# Patient Record
(demographics unavailable — no encounter records)

---

## 2025-07-15 NOTE — HISTORY OF PRESENT ILLNESS
[de-identified] : 75 year old female presents RT distal radius fracture. Pt reports a fall 7/01/25 after having her carpets cleaning & slipped & fell. She was seen at [facility], had x rays, told fracture & was placed in a splint & sling.   DOI: 7/01/25

## 2025-07-15 NOTE — PHYSICAL EXAM
[] : wrist swelling [Distal Radius] : distal radius [1st] : 1st [CMC Joint] : CMC joint [Right] : right wrist [FreeTextEntry8] : Cortical discontinuity distal radius, advance mild carpal arthritis & stage 4 first CMC arthritis

## 2025-07-15 NOTE — DISCUSSION/SUMMARY
[de-identified] : Discussed the nature of the diagnosis and risk and benefits of different modalities of treatment. RT distal radius fracture  RT wrist arthritis, mid carpal  RT first CMC arthritis  X rays reviewed & discussed She will splint  She will take MDP  Rx provided RTO 2 weeks

## 2025-07-15 NOTE — REASON FOR VISIT
[FreeTextEntry2] : New patient: This is a 74 y/o RHD F with right wrist pain starting 07/01/2025 after slipping on her floor at home. Went to [facility] and was referred to ortho. Diagnosed with nondisplaced FX of distal radius. Using sling daily. Numbness in the right fingers.

## 2025-07-29 NOTE — PHYSICAL EXAM
[Right] : right wrist [] : no ecchymosis [FreeTextEntry3] : decreased swelling and tenderness, distal radius tender [FreeTextEntry8] : Fracture is acceptably aligned, shows healing

## 2025-07-29 NOTE — REASON FOR VISIT
[FreeTextEntry2] : Patient is here for follow up fractured RT wrist. Pain has improved but notes increased pain at night. Using splint/sling daily. Slight tingling in the right hand. MDP provided relief and notes swelling/pain decreased. Pain level 2/10. A1c: 5.8

## 2025-07-29 NOTE — DISCUSSION/SUMMARY
[de-identified] : Discussed the nature of the diagnosis and risk and benefits of different modalities of treatment. RT distal radius fracture  RT wrist arthritis, mid carpal  RT first CMC arthritis  X rays reviewed & discussed She will continue to splint Patient will apply warm compresses & take OTC Tylenol as needed All questions answered  RTO 2 weeks

## 2025-07-29 NOTE — HISTORY OF PRESENT ILLNESS
[de-identified] : 75 year old female followed for RIGHT distal radius fracture.  She is 4 week ss/p injury and is being treated ion cock up wrist splint.  Her pain is much better DOI: 7/01/25